# Patient Record
Sex: MALE | Race: WHITE | NOT HISPANIC OR LATINO | URBAN - METROPOLITAN AREA
[De-identification: names, ages, dates, MRNs, and addresses within clinical notes are randomized per-mention and may not be internally consistent; named-entity substitution may affect disease eponyms.]

---

## 2022-11-15 ENCOUNTER — OFFICE VISIT (OUTPATIENT)
Dept: URGENT CARE | Facility: CLINIC | Age: 32
End: 2022-11-15
Payer: COMMERCIAL

## 2022-11-15 VITALS
OXYGEN SATURATION: 99 % | BODY MASS INDEX: 23.7 KG/M2 | HEIGHT: 72 IN | DIASTOLIC BLOOD PRESSURE: 89 MMHG | RESPIRATION RATE: 18 BRPM | TEMPERATURE: 97 F | WEIGHT: 175 LBS | HEART RATE: 72 BPM | SYSTOLIC BLOOD PRESSURE: 136 MMHG

## 2022-11-15 DIAGNOSIS — J02.9 SORE THROAT: ICD-10-CM

## 2022-11-15 DIAGNOSIS — J10.1 INFLUENZA B: Primary | ICD-10-CM

## 2022-11-15 LAB
CTP QC/QA: YES
FLUAV AG NPH QL: NEGATIVE
FLUBV AG NPH QL: POSITIVE

## 2022-11-15 PROCEDURE — 1159F PR MEDICATION LIST DOCUMENTED IN MEDICAL RECORD: ICD-10-PCS | Mod: CPTII,S$GLB,, | Performed by: NURSE PRACTITIONER

## 2022-11-15 PROCEDURE — 3079F PR MOST RECENT DIASTOLIC BLOOD PRESSURE 80-89 MM HG: ICD-10-PCS | Mod: CPTII,S$GLB,, | Performed by: NURSE PRACTITIONER

## 2022-11-15 PROCEDURE — 3008F BODY MASS INDEX DOCD: CPT | Mod: CPTII,S$GLB,, | Performed by: NURSE PRACTITIONER

## 2022-11-15 PROCEDURE — 87804 POCT INFLUENZA A/B: ICD-10-PCS | Mod: 59,QW,S$GLB, | Performed by: NURSE PRACTITIONER

## 2022-11-15 PROCEDURE — 3008F PR BODY MASS INDEX (BMI) DOCUMENTED: ICD-10-PCS | Mod: CPTII,S$GLB,, | Performed by: NURSE PRACTITIONER

## 2022-11-15 PROCEDURE — 1159F MED LIST DOCD IN RCRD: CPT | Mod: CPTII,S$GLB,, | Performed by: NURSE PRACTITIONER

## 2022-11-15 PROCEDURE — 99213 PR OFFICE/OUTPT VISIT, EST, LEVL III, 20-29 MIN: ICD-10-PCS | Mod: 25,S$GLB,, | Performed by: NURSE PRACTITIONER

## 2022-11-15 PROCEDURE — 87804 INFLUENZA ASSAY W/OPTIC: CPT | Mod: QW,S$GLB,, | Performed by: NURSE PRACTITIONER

## 2022-11-15 PROCEDURE — 3075F PR MOST RECENT SYSTOLIC BLOOD PRESS GE 130-139MM HG: ICD-10-PCS | Mod: CPTII,S$GLB,, | Performed by: NURSE PRACTITIONER

## 2022-11-15 PROCEDURE — 3079F DIAST BP 80-89 MM HG: CPT | Mod: CPTII,S$GLB,, | Performed by: NURSE PRACTITIONER

## 2022-11-15 PROCEDURE — 3075F SYST BP GE 130 - 139MM HG: CPT | Mod: CPTII,S$GLB,, | Performed by: NURSE PRACTITIONER

## 2022-11-15 PROCEDURE — 99213 OFFICE O/P EST LOW 20 MIN: CPT | Mod: 25,S$GLB,, | Performed by: NURSE PRACTITIONER

## 2022-11-15 RX ORDER — OSELTAMIVIR PHOSPHATE 75 MG/1
75 CAPSULE ORAL 2 TIMES DAILY
Qty: 10 CAPSULE | Refills: 0 | Status: SHIPPED | OUTPATIENT
Start: 2022-11-15 | End: 2022-11-20

## 2022-11-15 RX ORDER — DEXTROAMPHETAMINE SACCHARATE, AMPHETAMINE ASPARTATE, DEXTROAMPHETAMINE SULFATE AND AMPHETAMINE SULFATE 3.75; 3.75; 3.75; 3.75 MG/1; MG/1; MG/1; MG/1
15 TABLET ORAL EVERY MORNING
COMMUNITY
Start: 2022-10-28

## 2022-11-15 RX ORDER — DEXTROAMPHETAMINE SACCHARATE, AMPHETAMINE ASPARTATE MONOHYDRATE, DEXTROAMPHETAMINE SULFATE AND AMPHETAMINE SULFATE 3.75; 3.75; 3.75; 3.75 MG/1; MG/1; MG/1; MG/1
CAPSULE, EXTENDED RELEASE ORAL EVERY MORNING
COMMUNITY
Start: 2022-10-28

## 2022-11-15 NOTE — LETTER
November 15, 2022      [facility]  [address], [address]  [place] [address]  Phone: [phone]       Patient: Sidney Spencer   YOB: 1990  Date of Visit: 11/15/2022    To Whom It May Concern:    Donavon Spencer  was at [facility] on 11/15/2022. The patient may return to work/school on 11/18/2022 with no restrictions. If you have any questions or concerns, or if I can be of further assistance, please do not hesitate to contact me.    Sincerely,    Arlet Posada NP

## 2022-11-16 NOTE — PROGRESS NOTES
Subjective:       Patient ID: Sidney Spencer is a 32 y.o. male.    Vitals:  height is 6' (1.829 m) and weight is 79.4 kg (175 lb). His tympanic temperature is 97.1 °F (36.2 °C). His blood pressure is 136/89 and his pulse is 72. His respiration is 18 and oxygen saturation is 99%.     Chief Complaint: Sore Throat    32 yr old male presents to the Urgent Care with complaint of sore throat, cough, and generalized body aches x 1 day.     Sore Throat   This is a new problem. The current episode started yesterday. The problem has been gradually worsening. Neither side of throat is experiencing more pain than the other. There has been no fever. The pain is at a severity of 5/10. The pain is mild. Associated symptoms include congestion, coughing, a hoarse voice, neck pain, swollen glands and trouble swallowing. Pertinent negatives include no abdominal pain, diarrhea, drooling, ear discharge, ear pain, headaches, plugged ear sensation, shortness of breath, stridor or vomiting. He has tried acetaminophen for the symptoms. The treatment provided mild relief.     HENT:  Positive for congestion, sore throat and trouble swallowing. Negative for ear pain, ear discharge and drooling.    Neck: Positive for neck pain.   Respiratory:  Positive for cough. Negative for shortness of breath and stridor.    Gastrointestinal:  Negative for abdominal pain, vomiting and diarrhea.   Neurological:  Negative for headaches.     Objective:      Physical Exam   Constitutional: He is oriented to person, place, and time. He appears well-developed. He is cooperative.  Non-toxic appearance. He does not appear ill.   HENT:   Head: Atraumatic.   Ears:   Right Ear: Hearing, tympanic membrane, external ear and ear canal normal.   Left Ear: Hearing, tympanic membrane, external ear and ear canal normal.   Nose: Nose normal.   Mouth/Throat: Uvula is midline, oropharynx is clear and moist and mucous membranes are normal. Tonsils are 2+ on the right. Tonsils are 2+  on the left.   Neck: Neck supple.   Cardiovascular: Normal rate, regular rhythm and normal heart sounds.   Pulmonary/Chest: Effort normal and breath sounds normal. No accessory muscle usage or stridor. No tachypnea. No respiratory distress. He has no decreased breath sounds. He has no wheezes. He has no rhonchi. He has no rales.   Abdominal: Normal appearance.   Neurological: He is alert and oriented to person, place, and time. Gait normal.   Skin: Skin is warm, dry, intact and not diaphoretic.   Psychiatric: He experiences Normal attention. His speech is normal and behavior is normal. Mood normal. Cognition normal  Nursing note and vitals reviewed.      Assessment:       1. Influenza B    2. Sore throat          Results for orders placed or performed in visit on 11/15/22   POCT Influenza A/B   Result Value Ref Range    Rapid Influenza A Ag Negative Negative    Rapid Influenza B Ag Positive (A) Negative     Acceptable Yes          Plan:         Influenza B  -     oseltamivir (TAMIFLU) 75 MG capsule; Take 1 capsule (75 mg total) by mouth 2 (two) times daily. for 5 days  Dispense: 10 capsule; Refill: 0    Sore throat  -     POCT Influenza A/B       Patient Instructions   If you were prescribed a narcotic or controlled medication, do not drive or operate heavy equipment or machinery while taking these medications.  You must understand that you've received an Urgent Care treatment only and that you may be released before all your medical problems are known or treated. You, the patient, will arrange for follow up care as instructed.  Follow up with your PCP or specialty clinic as directed within 2-5 days if not improved or as needed.  You can call [phone] to schedule an appointment with the appropriate provider.  If your condition worsens we recommend that you receive another evaluation at the emergency room immediately or contact your primary medical clinics after hours call service to discuss your  concerns.  Please return here or go to the Emergency Department for any concerns or worsening of condition.

## 2022-11-16 NOTE — PATIENT INSTRUCTIONS

## 2023-05-30 ENCOUNTER — OFFICE VISIT (OUTPATIENT)
Dept: URGENT CARE | Facility: CLINIC | Age: 33
End: 2023-05-30
Payer: COMMERCIAL

## 2023-05-30 VITALS
HEART RATE: 75 BPM | OXYGEN SATURATION: 97 % | BODY MASS INDEX: 24.38 KG/M2 | SYSTOLIC BLOOD PRESSURE: 131 MMHG | WEIGHT: 180 LBS | HEIGHT: 72 IN | DIASTOLIC BLOOD PRESSURE: 91 MMHG | TEMPERATURE: 99 F | RESPIRATION RATE: 20 BRPM

## 2023-05-30 DIAGNOSIS — L03.213 PERIORBITAL CELLULITIS OF LEFT EYE: Primary | ICD-10-CM

## 2023-05-30 DIAGNOSIS — H00.015 HORDEOLUM EXTERNUM OF LEFT LOWER EYELID: ICD-10-CM

## 2023-05-30 PROCEDURE — 99214 OFFICE O/P EST MOD 30 MIN: CPT | Mod: S$GLB,,, | Performed by: NURSE PRACTITIONER

## 2023-05-30 PROCEDURE — 99214 PR OFFICE/OUTPT VISIT, EST, LEVL IV, 30-39 MIN: ICD-10-PCS | Mod: S$GLB,,, | Performed by: NURSE PRACTITIONER

## 2023-05-30 RX ORDER — AMOXICILLIN AND CLAVULANATE POTASSIUM 875; 125 MG/1; MG/1
1 TABLET, FILM COATED ORAL EVERY 12 HOURS
Qty: 20 TABLET | Refills: 0 | Status: SHIPPED | OUTPATIENT
Start: 2023-05-30 | End: 2023-06-09

## 2023-05-30 RX ORDER — ERYTHROMYCIN 5 MG/G
OINTMENT OPHTHALMIC EVERY 6 HOURS
Qty: 3.5 G | Refills: 0 | Status: SHIPPED | OUTPATIENT
Start: 2023-05-30 | End: 2023-06-06

## 2023-05-30 NOTE — PROGRESS NOTES
Subjective:      Patient ID: Sidney Spencer is a 33 y.o. male.    Vitals:  height is 6' (1.829 m) and weight is 81.6 kg (180 lb). His tympanic temperature is 98.5 °F (36.9 °C). His blood pressure is 131/91 (abnormal) and his pulse is 75. His respiration is 20 and oxygen saturation is 97%.     Chief Complaint: Other Misc (I have a stye in my left eye that has been there for 3 days and is now putting pressure on my eye. Seeing if antibiotic eye drops are needed. - Entered by patient)        Patient is a 33-year-old male who presents to urgent care for evaluation of left redness. Associated symptoms include left eye swelling and pressure. Symptoms started about 3 days ago with stye to the left eye. Denies associated symptoms of fever, chills or body aches. He does not wear contacts.     Eye Problem   The left eye is affected. This is a new problem. The current episode started in the past 7 days. The problem occurs constantly. The problem has been unchanged. There was no injury mechanism. The pain is at a severity of 6/10. The pain is mild. He Does not wear contacts. Associated symptoms include an eye discharge, double vision, eye redness and itching. Pertinent negatives include no blurred vision, fever, foreign body sensation, nausea, photophobia, recent URI or vomiting. He has tried water for the symptoms. The treatment provided no relief.     Constitution: Negative for fever.   Eyes:  Positive for eye discharge, eye itching, eye redness, double vision and eyelid swelling. Negative for eye trauma, foreign body in eye, photophobia and blurred vision.   Gastrointestinal:  Negative for nausea and vomiting.    Objective:     Physical Exam   Constitutional: He is oriented to person, place, and time. He appears well-developed. He is cooperative.  Non-toxic appearance. He does not appear ill. No distress.   HENT:   Head: Normocephalic and atraumatic.   Ears:   Right Ear: Hearing, tympanic membrane, external ear and ear canal  normal.   Left Ear: Hearing, tympanic membrane, external ear and ear canal normal.   Nose: Nose normal. No mucosal edema, rhinorrhea or nasal deformity. No epistaxis. Right sinus exhibits no maxillary sinus tenderness and no frontal sinus tenderness. Left sinus exhibits no maxillary sinus tenderness and no frontal sinus tenderness.   Mouth/Throat: Uvula is midline, oropharynx is clear and moist and mucous membranes are normal. No trismus in the jaw. Normal dentition. No uvula swelling. No oropharyngeal exudate, posterior oropharyngeal edema or posterior oropharyngeal erythema.   Eyes: Lids are normal. Lids are everted and swept, no foreign bodies found. Left eye exhibits hordeolum (lower eye lid). No foreign body present in the left eye. Left conjunctiva is injected. No scleral icterus.      Comments: Swelling around left eye    Neck: Trachea normal and phonation normal. Neck supple. No edema present. No erythema present. No neck rigidity present.   Cardiovascular: Normal rate, regular rhythm, normal heart sounds and normal pulses.   Pulmonary/Chest: Effort normal and breath sounds normal. No respiratory distress. He has no decreased breath sounds. He has no rhonchi.   Abdominal: Normal appearance.   Musculoskeletal: Normal range of motion.         General: No deformity. Normal range of motion.   Neurological: He is alert and oriented to person, place, and time. He exhibits normal muscle tone. Coordination normal.   Skin: Skin is warm, dry, intact, not diaphoretic and not pale.   Psychiatric: His speech is normal and behavior is normal. Judgment and thought content normal.   Nursing note and vitals reviewed.          Assessment:     1. Periorbital cellulitis of left eye    2. Hordeolum externum of left lower eyelid        Plan:       Periorbital cellulitis of left eye    Hordeolum externum of left lower eyelid    Other orders  -     amoxicillin-clavulanate 875-125mg (AUGMENTIN) 875-125 mg per tablet; Take 1 tablet  by mouth every 12 (twelve) hours. for 10 days  Dispense: 20 tablet; Refill: 0  -     erythromycin (ROMYCIN) ophthalmic ointment; Place into the left eye every 6 (six) hours. for 7 days  Dispense: 3.5 g; Refill: 0          Medical Decision Making:   Initial Assessment:     Patient presents to urgent care for evaluation of left redness.  Associated symptoms include left eye swelling.  Patient denies fever chills or body aches.  He is noted to have a left lower lid stye present.  Symptoms started about 3 days ago.   Differential Diagnosis:   Bacterial conjunctivitis, Blepharitis, Orbital cellulitis, viral conjunctivitis    Urgent Care Management:    Patient presents to urgent care with left conjunctivitic redness, left eye stye and swelling around around left eye. He denies fever, chills or body ache. I have concerns for possible periorbital cellulitis. Will started PO antibiotic and erythromycin ointment to left eye. Follow up with Emergency Room, PCP or urgent care if symptoms worsen or fail to improve.

## 2023-06-02 ENCOUNTER — TELEPHONE (OUTPATIENT)
Dept: URGENT CARE | Facility: CLINIC | Age: 33
End: 2023-06-02
Payer: COMMERCIAL

## 2023-06-02 NOTE — TELEPHONE ENCOUNTER
Gave patient a call to see if he's feeling better. No answer, but I left the patient a voice message with the clinic's phone number.

## 2024-04-23 ENCOUNTER — OFFICE VISIT (OUTPATIENT)
Dept: UROLOGY | Facility: CLINIC | Age: 34
End: 2024-04-23
Payer: COMMERCIAL

## 2024-04-23 ENCOUNTER — HOSPITAL ENCOUNTER (OUTPATIENT)
Dept: RADIOLOGY | Facility: HOSPITAL | Age: 34
Discharge: HOME | End: 2024-04-23
Attending: UROLOGY
Payer: COMMERCIAL

## 2024-04-23 VITALS
BODY MASS INDEX: 24.36 KG/M2 | DIASTOLIC BLOOD PRESSURE: 75 MMHG | HEART RATE: 83 BPM | WEIGHT: 179.88 LBS | SYSTOLIC BLOOD PRESSURE: 114 MMHG | HEIGHT: 72 IN

## 2024-04-23 DIAGNOSIS — N20.0 KIDNEY STONE: Primary | ICD-10-CM

## 2024-04-23 DIAGNOSIS — N20.0 KIDNEY STONE: ICD-10-CM

## 2024-04-23 PROCEDURE — 3078F DIAST BP <80 MM HG: CPT | Mod: CPTII,S$GLB,, | Performed by: UROLOGY

## 2024-04-23 PROCEDURE — 74176 CT ABD & PELVIS W/O CONTRAST: CPT | Mod: TC

## 2024-04-23 PROCEDURE — 1160F RVW MEDS BY RX/DR IN RCRD: CPT | Mod: CPTII,S$GLB,, | Performed by: UROLOGY

## 2024-04-23 PROCEDURE — 74176 CT ABD & PELVIS W/O CONTRAST: CPT | Mod: 26,,, | Performed by: RADIOLOGY

## 2024-04-23 PROCEDURE — 99204 OFFICE O/P NEW MOD 45 MIN: CPT | Mod: S$GLB,,, | Performed by: UROLOGY

## 2024-04-23 PROCEDURE — 99999 PR PBB SHADOW E&M-EST. PATIENT-LVL IV: CPT | Mod: PBBFAC,,, | Performed by: UROLOGY

## 2024-04-23 PROCEDURE — 3008F BODY MASS INDEX DOCD: CPT | Mod: CPTII,S$GLB,, | Performed by: UROLOGY

## 2024-04-23 PROCEDURE — 1159F MED LIST DOCD IN RCRD: CPT | Mod: CPTII,S$GLB,, | Performed by: UROLOGY

## 2024-04-23 PROCEDURE — 3074F SYST BP LT 130 MM HG: CPT | Mod: CPTII,S$GLB,, | Performed by: UROLOGY

## 2024-04-23 RX ORDER — CEFAZOLIN SODIUM 2 G/50ML
2 SOLUTION INTRAVENOUS
OUTPATIENT
Start: 2024-04-23

## 2024-04-23 RX ORDER — KETOROLAC TROMETHAMINE 10 MG/1
10 TABLET, FILM COATED ORAL EVERY 6 HOURS
Qty: 20 TABLET | Refills: 0 | Status: SHIPPED | OUTPATIENT
Start: 2024-04-23 | End: 2024-04-28

## 2024-04-23 RX ORDER — TAMSULOSIN HYDROCHLORIDE 0.4 MG/1
0.4 CAPSULE ORAL DAILY
Qty: 60 CAPSULE | Refills: 0 | Status: SHIPPED | OUTPATIENT
Start: 2024-04-23 | End: 2025-04-23

## 2024-04-24 ENCOUNTER — TELEPHONE (OUTPATIENT)
Dept: UROLOGY | Facility: CLINIC | Age: 34
End: 2024-04-24
Payer: COMMERCIAL

## 2024-04-24 ENCOUNTER — PATIENT MESSAGE (OUTPATIENT)
Dept: UROLOGY | Facility: CLINIC | Age: 34
End: 2024-04-24
Payer: COMMERCIAL

## 2024-04-24 DIAGNOSIS — Z01.818 PRE-OP EXAM: Primary | ICD-10-CM

## 2024-04-24 NOTE — TELEPHONE ENCOUNTER
Message sent to patient in order to schedule pre op and sign consent.    ----- Message from Adolfo Adan MD sent at 4/23/2024  4:50 PM CDT -----  Please schedule pre-op stuff for ureteroscopy 5/6, thanks

## 2024-04-29 ENCOUNTER — PATIENT MESSAGE (OUTPATIENT)
Dept: UROLOGY | Facility: CLINIC | Age: 34
End: 2024-04-29
Payer: COMMERCIAL

## 2024-04-29 ENCOUNTER — TELEPHONE (OUTPATIENT)
Dept: UROLOGY | Facility: CLINIC | Age: 34
End: 2024-04-29
Payer: COMMERCIAL

## 2024-04-29 ENCOUNTER — TELEPHONE (OUTPATIENT)
Dept: PREADMISSION TESTING | Facility: HOSPITAL | Age: 34
End: 2024-04-29
Payer: COMMERCIAL

## 2024-04-29 NOTE — TELEPHONE ENCOUNTER
Message sent to patient because he don't answer the phone, waiting on his response to let Dr Adan know.     ----- Message from Lydia Dickens RN sent at 4/29/2024  1:35 PM CDT -----  Regarding: surgery cx  Good afternoon, pt is asking to postpone surgery on 5/6/24, he states he is doing well and not sure if he needs another ultrasound. Please reach out to patient. Thank you.    Surgery Pre Admit Dept  [phone]

## 2024-04-29 NOTE — TELEPHONE ENCOUNTER
Lydia Dickens, [facility] Staff  Cc: Adolfo Adan MD  Good afternoon, pt is asking to postpone surgery on 5/6/24, he states he is doing well and not sure if he needs another ultrasound. Please reach out to patient. Thank you.    Surgery Pre Admit Dept  [phone]

## 2025-02-25 ENCOUNTER — OFFICE VISIT (OUTPATIENT)
Dept: UROLOGY | Facility: CLINIC | Age: 35
End: 2025-02-25
Payer: COMMERCIAL

## 2025-02-25 VITALS — HEART RATE: 85 BPM | DIASTOLIC BLOOD PRESSURE: 90 MMHG | SYSTOLIC BLOOD PRESSURE: 124 MMHG

## 2025-02-25 DIAGNOSIS — N20.0 KIDNEY STONE: Primary | ICD-10-CM

## 2025-02-25 PROCEDURE — 3080F DIAST BP >= 90 MM HG: CPT | Mod: CPTII,S$GLB,, | Performed by: UROLOGY

## 2025-02-25 PROCEDURE — 3074F SYST BP LT 130 MM HG: CPT | Mod: CPTII,S$GLB,, | Performed by: UROLOGY

## 2025-02-25 PROCEDURE — 1159F MED LIST DOCD IN RCRD: CPT | Mod: CPTII,S$GLB,, | Performed by: UROLOGY

## 2025-02-25 PROCEDURE — 99999 PR PBB SHADOW E&M-EST. PATIENT-LVL III: CPT | Mod: PBBFAC,,, | Performed by: UROLOGY

## 2025-02-25 PROCEDURE — 99214 OFFICE O/P EST MOD 30 MIN: CPT | Mod: S$GLB,,, | Performed by: UROLOGY

## 2025-02-25 PROCEDURE — 1160F RVW MEDS BY RX/DR IN RCRD: CPT | Mod: CPTII,S$GLB,, | Performed by: UROLOGY

## 2025-02-25 NOTE — PROGRESS NOTES
Chief Complaint:  Right flank pain    HPI:   02/25/2025 - patient returns today for follow-up, presented to the ED at [facility] 2/20, repeat CT showed right distal ureteral stone, same stone as prior, has not passed his previous stone, no pain on the left, patient wants to proceed with ureteroscopy    04/23/2024 - 35 yo male that presents for evaluation of right flank pain.  Patient began having pain on Sunday which was intermittent in nature and felt just like he did when he had a prior kidney stone.  Went to [facility] in [place] and ultrasound showed some right-sided hydronephrosis.  Patient was fortunate enough to find an appointment with me today.  Notes that his pain is pretty well-controlled with the Toradol, has only taken one or two of the hydrocodone.  No gross hematuria or dysuria.  No prior urologic procedures.  Was able to pass his last stone.    PMH:  Past Medical History:   Diagnosis Date    ADHD (attention deficit hyperactivity disorder)        PSH:  Past Surgical History:   Procedure Laterality Date    TONSILLECTOMY         Family History:  No family history on file.    Social History:  Social History     Tobacco Use    Smoking status: Never     Passive exposure: Never    Smokeless tobacco: Never   Substance Use Topics    Alcohol use: Yes    Drug use: Never        Review of Systems:  General: No fever, chills  Skin: No rashes  Chest:  Denies cough and sputum production  Heart: Denies chest pain  Resp: Denies dyspnea  Abdomen: Denies diarrhea, abdominal pain, hematemesis, or blood in stool.  Musculoskeletal: No joint stiffness or swelling. Denies back pain.  : see HPI  Neuro: no dizziness or weakness    Allergies:  Patient has no known allergies.    Medications:    Current Outpatient Medications:     dextroamphetamine-amphetamine (ADDERALL XR) 15 MG 24 hr capsule, Take by mouth every morning., Disp: , Rfl:     dextroamphetamine-amphetamine (ADDERALL) 15 mg tablet, Take 15 mg by mouth  every morning., Disp: , Rfl:     tamsulosin (FLOMAX) 0.4 mg Cap, Take 1 capsule (0.4 mg total) by mouth once daily., Disp: 60 capsule, Rfl: 0    Physical Exam:  Vitals:    02/25/25 1528   BP: (!) 124/90   Pulse: 85     There is no height or weight on file to calculate BMI.  General: awake, alert, cooperative  Head: NC/AT  Ears: external ears normal  Eyes: sclera normal  Lungs: normal inspiration, NAD  Heart: well-perfused  Skin: The skin is warm and dry  Ext: No c/c/e.  Neuro: grossly intact, AOx3    RADIOLOGY:  CT RENAL STONE STUDY ABD PELVIS WO     CLINICAL HISTORY:  Flank pain, kidney stone suspected; Calculus of kidney     TECHNIQUE:  Low dose axial images, sagittal and coronal reformations were obtained from the lung bases to the pubic symphysis.  Contrast was not administered.     COMPARISON:  None     FINDINGS:  Heart: Normal in size. No pericardial effusion.     Lung Bases: Well aerated, without consolidation or pleural fluid.     Liver: Normal in size and attenuation, with no focal hepatic lesions.     Gallbladder: No calcified gallstones.     Bile Ducts: No evidence of dilated ducts.     Pancreas: No mass or peripancreatic fat stranding.     Spleen: Unremarkable.     Adrenals: Unremarkable.     Kidneys/ Ureters: 5 mm calculus at the junction of the proximal and middle thirds of the right ureter.  Associated mild ipsilateral right hydroureteronephrosis.  4 mm nonobstructing calculus in the midpole of the left kidney.  No renal mass.     Bladder: No calculi.  No evidence of wall thickening.     Reproductive organs: Unremarkable.     GI Tract/Mesentery: No evidence of bowel obstruction or inflammation.  Normal appendix.     Peritoneal Space: No ascites. No free air.     Retroperitoneum: No significant adenopathy.     Abdominal wall: Unremarkable.     Vasculature: No significant atherosclerosis or aneurysm.     Bones: No acute fracture.     Impression:     1. Obstructing 5 mm right ureteral calculus.  2.  "Nonobstructing left nephrolithiasis.    LABS:  I personally reviewed the following lab values:  No results found for: "WBC", "HGB", "HCT", "PLT", "NA", "K", "CL", "CREATININE", "BUN", "CO2", "TSH", "PSA", "INR", "GLUF", "HGBA1C", "MICROALBUR", "CHOL", "TRIG", "HDL", "LDLDIRECT", "ALT", "AST"      Assessment/Plan:   Sidney Spencer is a 35 y.o. male with:    Right ureteral stone - per patient report, CT 2/20 showed distal migration of his previously noted right ureteral stone, reviewed management, strongly recommend ureteroscopy and stone removal, patient is amenable, he will look at his dates and send us a message, in the interim he will obtain the CT from [facility] and drop this off for me to review      Adolfo Adan MD  Urology    "